# Patient Record
Sex: FEMALE | Race: WHITE | NOT HISPANIC OR LATINO | URBAN - METROPOLITAN AREA
[De-identification: names, ages, dates, MRNs, and addresses within clinical notes are randomized per-mention and may not be internally consistent; named-entity substitution may affect disease eponyms.]

---

## 2019-10-17 ENCOUNTER — NEW PATIENT (OUTPATIENT)
Dept: URBAN - METROPOLITAN AREA CLINIC 44 | Facility: CLINIC | Age: 59
End: 2019-10-17
Payer: COMMERCIAL

## 2019-10-17 DIAGNOSIS — H04.213 EPIPHORA DUE TO EXCESS LACRIMATION, BI LACRIMAL GLANDS: Primary | ICD-10-CM

## 2019-10-17 PROCEDURE — 92004 COMPRE OPH EXAM NEW PT 1/>: CPT | Performed by: OPTOMETRIST

## 2019-10-17 ASSESSMENT — INTRAOCULAR PRESSURE
OS: 14
OD: 14

## 2019-10-17 ASSESSMENT — VISUAL ACUITY
OD: 20/20
OS: 20/25

## 2019-10-17 ASSESSMENT — KERATOMETRY
OD: 44.75
OS: 44.88

## 2019-11-21 ENCOUNTER — FOLLOW UP ESTABLISHED (OUTPATIENT)
Dept: URBAN - METROPOLITAN AREA CLINIC 44 | Facility: CLINIC | Age: 59
End: 2019-11-21
Payer: COMMERCIAL

## 2019-11-21 DIAGNOSIS — H16.223 KERATOCONJUNCTIVITIS SICCA, BILATERAL: ICD-10-CM

## 2019-11-21 PROCEDURE — 92285 EXTERNAL OCULAR PHOTOGRAPHY: CPT | Performed by: OPHTHALMOLOGY

## 2019-11-21 PROCEDURE — 68801 DILATE TEAR DUCT OPENING: CPT | Performed by: OPHTHALMOLOGY

## 2019-11-21 PROCEDURE — 68840 EXPLORE/IRRIGATE TEAR DUCTS: CPT | Performed by: OPHTHALMOLOGY

## 2019-11-21 PROCEDURE — 99203 OFFICE O/P NEW LOW 30 MIN: CPT | Performed by: OPHTHALMOLOGY

## 2022-02-24 ENCOUNTER — OFFICE VISIT (OUTPATIENT)
Dept: URBAN - METROPOLITAN AREA CLINIC 10 | Facility: CLINIC | Age: 62
End: 2022-02-24
Payer: COMMERCIAL

## 2022-02-24 DIAGNOSIS — H11.31 SUBCONJUNCTIVAL HEMORRHAGE OF RIGHT EYE: Primary | ICD-10-CM

## 2022-02-24 PROCEDURE — 99213 OFFICE O/P EST LOW 20 MIN: CPT | Performed by: STUDENT IN AN ORGANIZED HEALTH CARE EDUCATION/TRAINING PROGRAM

## 2022-02-24 NOTE — IMPRESSION/PLAN
Impression: Subconjunctival hemorrhage of right eye: H11.31. Plan: Patient educated on condition,
start PFAT's 6x/day RTC for next available CE